# Patient Record
Sex: MALE | Race: WHITE | Employment: UNEMPLOYED | URBAN - NONMETROPOLITAN AREA
[De-identification: names, ages, dates, MRNs, and addresses within clinical notes are randomized per-mention and may not be internally consistent; named-entity substitution may affect disease eponyms.]

---

## 2022-12-22 ENCOUNTER — HOSPITAL ENCOUNTER (OUTPATIENT)
Age: 42
Setting detail: OBSERVATION
Discharge: HOME | End: 2022-12-24
Attending: INTERNAL MEDICINE | Admitting: INTERNAL MEDICINE
Payer: COMMERCIAL

## 2022-12-22 ENCOUNTER — APPOINTMENT (OUTPATIENT)
Dept: CT IMAGING | Age: 42
End: 2022-12-22
Payer: COMMERCIAL

## 2022-12-22 DIAGNOSIS — F11.93 OPIOID WITHDRAWAL (HCC): ICD-10-CM

## 2022-12-22 DIAGNOSIS — R11.2 INTRACTABLE NAUSEA AND VOMITING: Primary | ICD-10-CM

## 2022-12-22 DIAGNOSIS — N28.89 KIDNEY MASS: ICD-10-CM

## 2022-12-22 DIAGNOSIS — F19.10 POLYSUBSTANCE ABUSE (HCC): ICD-10-CM

## 2022-12-22 LAB
ALBUMIN SERPL-MCNC: 4.5 G/DL (ref 3.5–5.1)
ALP BLD-CCNC: 98 U/L (ref 38–126)
ALT SERPL-CCNC: 20 U/L (ref 11–66)
AMPHETAMINE+METHAMPHETAMINE URINE SCREEN: POSITIVE
ANION GAP SERPL CALCULATED.3IONS-SCNC: 16 MEQ/L (ref 8–16)
AST SERPL-CCNC: 17 U/L (ref 5–40)
BACTERIA: ABNORMAL /HPF
BARBITURATE QUANTITATIVE URINE: NEGATIVE
BASOPHILS # BLD: 0.5 %
BASOPHILS ABSOLUTE: 0.1 THOU/MM3 (ref 0–0.1)
BENZODIAZEPINE QUANTITATIVE URINE: NEGATIVE
BILIRUB SERPL-MCNC: 1 MG/DL (ref 0.3–1.2)
BILIRUBIN URINE: NEGATIVE
BLOOD, URINE: NEGATIVE
BUN BLDV-MCNC: 16 MG/DL (ref 7–22)
C-REACTIVE PROTEIN: < 0.3 MG/DL (ref 0–1)
CALCIUM SERPL-MCNC: 9.8 MG/DL (ref 8.5–10.5)
CANNABINOID QUANTITATIVE URINE: POSITIVE
CASTS 2: ABNORMAL /LPF
CASTS UA: ABNORMAL /LPF
CHARACTER, URINE: CLEAR
CHLORIDE BLD-SCNC: 102 MEQ/L (ref 98–111)
CO2: 22 MEQ/L (ref 23–33)
COCAINE METABOLITE QUANTITATIVE URINE: NEGATIVE
COLOR: YELLOW
CREAT SERPL-MCNC: 0.6 MG/DL (ref 0.4–1.2)
CRYSTALS, UA: ABNORMAL
EKG ATRIAL RATE: 59 BPM
EKG P AXIS: -14 DEGREES
EKG P-R INTERVAL: 144 MS
EKG Q-T INTERVAL: 418 MS
EKG QRS DURATION: 100 MS
EKG QTC CALCULATION (BAZETT): 413 MS
EKG R AXIS: 54 DEGREES
EKG T AXIS: 50 DEGREES
EKG VENTRICULAR RATE: 59 BPM
EOSINOPHIL # BLD: 0.2 %
EOSINOPHILS ABSOLUTE: 0 THOU/MM3 (ref 0–0.4)
EPITHELIAL CELLS, UA: ABNORMAL /HPF
ERYTHROCYTE [DISTWIDTH] IN BLOOD BY AUTOMATED COUNT: 12 % (ref 11.5–14.5)
ERYTHROCYTE [DISTWIDTH] IN BLOOD BY AUTOMATED COUNT: 36.3 FL (ref 35–45)
ETHYL ALCOHOL, SERUM: < 0.01 %
FENTANYL: POSITIVE
GFR SERPL CREATININE-BSD FRML MDRD: > 60 ML/MIN/1.73M2
GLUCOSE BLD-MCNC: 153 MG/DL (ref 70–108)
GLUCOSE URINE: NEGATIVE MG/DL
HCT VFR BLD CALC: 45.9 % (ref 42–52)
HEMOGLOBIN: 16.4 GM/DL (ref 14–18)
IMMATURE GRANS (ABS): 0.07 THOU/MM3 (ref 0–0.07)
IMMATURE GRANULOCYTES: 0.5 %
INFLUENZA A: NOT DETECTED
INFLUENZA B: NOT DETECTED
KETONES, URINE: 80
LACTIC ACID: 1.3 MMOL/L (ref 0.5–2)
LEUKOCYTE ESTERASE, URINE: NEGATIVE
LIPASE: 10.5 U/L (ref 5.6–51.3)
LYMPHOCYTES # BLD: 16.3 %
LYMPHOCYTES ABSOLUTE: 2.3 THOU/MM3 (ref 1–4.8)
MAGNESIUM: 2.2 MG/DL (ref 1.6–2.4)
MCH RBC QN AUTO: 30.1 PG (ref 26–33)
MCHC RBC AUTO-ENTMCNC: 35.7 GM/DL (ref 32.2–35.5)
MCV RBC AUTO: 84.2 FL (ref 80–94)
MISCELLANEOUS 2: ABNORMAL
MONOCYTES # BLD: 3.6 %
MONOCYTES ABSOLUTE: 0.5 THOU/MM3 (ref 0.4–1.3)
NITRITE, URINE: NEGATIVE
NUCLEATED RED BLOOD CELLS: 0 /100 WBC
OPIATES, URINE: NEGATIVE
OSMOLALITY CALCULATION: 283.6 MOSMOL/KG (ref 275–300)
OXYCODONE: POSITIVE
PH UA: 8.5 (ref 5–9)
PHENCYCLIDINE QUANTITATIVE URINE: NEGATIVE
PLATELET # BLD: 418 THOU/MM3 (ref 130–400)
PMV BLD AUTO: 9.7 FL (ref 9.4–12.4)
POTASSIUM SERPL-SCNC: 3.7 MEQ/L (ref 3.5–5.2)
PROTEIN UA: 30
RBC # BLD: 5.45 MILL/MM3 (ref 4.7–6.1)
RBC URINE: ABNORMAL /HPF
RENAL EPITHELIAL, UA: ABNORMAL
SARS-COV-2 RNA, RT PCR: NOT DETECTED
SEG NEUTROPHILS: 78.9 %
SEGMENTED NEUTROPHILS ABSOLUTE COUNT: 11 THOU/MM3 (ref 1.8–7.7)
SODIUM BLD-SCNC: 140 MEQ/L (ref 135–145)
SPECIFIC GRAVITY, URINE: 1.03 (ref 1–1.03)
TOTAL PROTEIN: 7.7 G/DL (ref 6.1–8)
TROPONIN T: < 0.01 NG/ML
UROBILINOGEN, URINE: 1 EU/DL (ref 0–1)
WBC # BLD: 14 THOU/MM3 (ref 4.8–10.8)
WBC UA: ABNORMAL /HPF
YEAST: ABNORMAL

## 2022-12-22 PROCEDURE — 96374 THER/PROPH/DIAG INJ IV PUSH: CPT

## 2022-12-22 PROCEDURE — 83605 ASSAY OF LACTIC ACID: CPT

## 2022-12-22 PROCEDURE — 6370000000 HC RX 637 (ALT 250 FOR IP): Performed by: PHYSICIAN ASSISTANT

## 2022-12-22 PROCEDURE — 80307 DRUG TEST PRSMV CHEM ANLYZR: CPT

## 2022-12-22 PROCEDURE — 96372 THER/PROPH/DIAG INJ SC/IM: CPT

## 2022-12-22 PROCEDURE — 2580000003 HC RX 258: Performed by: STUDENT IN AN ORGANIZED HEALTH CARE EDUCATION/TRAINING PROGRAM

## 2022-12-22 PROCEDURE — 84484 ASSAY OF TROPONIN QUANT: CPT

## 2022-12-22 PROCEDURE — 99285 EMERGENCY DEPT VISIT HI MDM: CPT

## 2022-12-22 PROCEDURE — 74177 CT ABD & PELVIS W/CONTRAST: CPT

## 2022-12-22 PROCEDURE — 82077 ASSAY SPEC XCP UR&BREATH IA: CPT

## 2022-12-22 PROCEDURE — 96376 TX/PRO/DX INJ SAME DRUG ADON: CPT

## 2022-12-22 PROCEDURE — G0378 HOSPITAL OBSERVATION PER HR: HCPCS

## 2022-12-22 PROCEDURE — 96375 TX/PRO/DX INJ NEW DRUG ADDON: CPT

## 2022-12-22 PROCEDURE — 83735 ASSAY OF MAGNESIUM: CPT

## 2022-12-22 PROCEDURE — 96361 HYDRATE IV INFUSION ADD-ON: CPT

## 2022-12-22 PROCEDURE — 86140 C-REACTIVE PROTEIN: CPT

## 2022-12-22 PROCEDURE — 85025 COMPLETE CBC W/AUTO DIFF WBC: CPT

## 2022-12-22 PROCEDURE — 6370000000 HC RX 637 (ALT 250 FOR IP): Performed by: STUDENT IN AN ORGANIZED HEALTH CARE EDUCATION/TRAINING PROGRAM

## 2022-12-22 PROCEDURE — 6360000002 HC RX W HCPCS

## 2022-12-22 PROCEDURE — 6360000002 HC RX W HCPCS: Performed by: PHYSICIAN ASSISTANT

## 2022-12-22 PROCEDURE — 6360000004 HC RX CONTRAST MEDICATION: Performed by: PHYSICIAN ASSISTANT

## 2022-12-22 PROCEDURE — 80053 COMPREHEN METABOLIC PANEL: CPT

## 2022-12-22 PROCEDURE — 2580000003 HC RX 258: Performed by: PHYSICIAN ASSISTANT

## 2022-12-22 PROCEDURE — 6360000002 HC RX W HCPCS: Performed by: STUDENT IN AN ORGANIZED HEALTH CARE EDUCATION/TRAINING PROGRAM

## 2022-12-22 PROCEDURE — 83690 ASSAY OF LIPASE: CPT

## 2022-12-22 PROCEDURE — 87636 SARSCOV2 & INF A&B AMP PRB: CPT

## 2022-12-22 PROCEDURE — 81001 URINALYSIS AUTO W/SCOPE: CPT

## 2022-12-22 PROCEDURE — 36415 COLL VENOUS BLD VENIPUNCTURE: CPT

## 2022-12-22 PROCEDURE — 93005 ELECTROCARDIOGRAM TRACING: CPT | Performed by: PHYSICIAN ASSISTANT

## 2022-12-22 RX ORDER — HALOPERIDOL 5 MG/ML
2.5 INJECTION INTRAMUSCULAR
Status: COMPLETED | OUTPATIENT
Start: 2022-12-22 | End: 2022-12-22

## 2022-12-22 RX ORDER — 0.9 % SODIUM CHLORIDE 0.9 %
1000 INTRAVENOUS SOLUTION INTRAVENOUS ONCE
Status: COMPLETED | OUTPATIENT
Start: 2022-12-22 | End: 2022-12-22

## 2022-12-22 RX ORDER — PROMETHAZINE HYDROCHLORIDE 25 MG/ML
6.25 INJECTION, SOLUTION INTRAMUSCULAR; INTRAVENOUS EVERY 6 HOURS PRN
Status: DISCONTINUED | OUTPATIENT
Start: 2022-12-22 | End: 2022-12-24 | Stop reason: HOSPADM

## 2022-12-22 RX ORDER — ACETAMINOPHEN 325 MG/1
650 TABLET ORAL EVERY 6 HOURS PRN
Status: DISCONTINUED | OUTPATIENT
Start: 2022-12-22 | End: 2022-12-24 | Stop reason: HOSPADM

## 2022-12-22 RX ORDER — LORAZEPAM 2 MG/ML
1 INJECTION INTRAMUSCULAR EVERY 6 HOURS PRN
Status: DISCONTINUED | OUTPATIENT
Start: 2022-12-22 | End: 2022-12-22

## 2022-12-22 RX ORDER — ONDANSETRON 2 MG/ML
4 INJECTION INTRAMUSCULAR; INTRAVENOUS EVERY 6 HOURS PRN
Status: DISCONTINUED | OUTPATIENT
Start: 2022-12-22 | End: 2022-12-24 | Stop reason: HOSPADM

## 2022-12-22 RX ORDER — ONDANSETRON 2 MG/ML
4 INJECTION INTRAMUSCULAR; INTRAVENOUS ONCE
Status: COMPLETED | OUTPATIENT
Start: 2022-12-22 | End: 2022-12-22

## 2022-12-22 RX ORDER — SODIUM CHLORIDE 9 MG/ML
INJECTION, SOLUTION INTRAVENOUS PRN
Status: DISCONTINUED | OUTPATIENT
Start: 2022-12-22 | End: 2022-12-24 | Stop reason: HOSPADM

## 2022-12-22 RX ORDER — ONDANSETRON 4 MG/1
4 TABLET, ORALLY DISINTEGRATING ORAL EVERY 8 HOURS PRN
Status: DISCONTINUED | OUTPATIENT
Start: 2022-12-22 | End: 2022-12-24 | Stop reason: HOSPADM

## 2022-12-22 RX ORDER — DICYCLOMINE HYDROCHLORIDE 10 MG/ML
20 INJECTION INTRAMUSCULAR ONCE
Status: COMPLETED | OUTPATIENT
Start: 2022-12-22 | End: 2022-12-22

## 2022-12-22 RX ORDER — LORAZEPAM 2 MG/ML
1 INJECTION INTRAMUSCULAR ONCE
Status: COMPLETED | OUTPATIENT
Start: 2022-12-22 | End: 2022-12-22

## 2022-12-22 RX ORDER — LANOLIN ALCOHOL/MO/W.PET/CERES
1.5 CREAM (GRAM) TOPICAL NIGHTLY PRN
Status: DISCONTINUED | OUTPATIENT
Start: 2022-12-22 | End: 2022-12-24 | Stop reason: HOSPADM

## 2022-12-22 RX ORDER — HYDROXYZINE PAMOATE 25 MG/1
50 CAPSULE ORAL ONCE
Status: COMPLETED | OUTPATIENT
Start: 2022-12-22 | End: 2022-12-22

## 2022-12-22 RX ORDER — ACETAMINOPHEN 650 MG/1
650 SUPPOSITORY RECTAL EVERY 6 HOURS PRN
Status: DISCONTINUED | OUTPATIENT
Start: 2022-12-22 | End: 2022-12-24 | Stop reason: HOSPADM

## 2022-12-22 RX ORDER — SODIUM CHLORIDE 0.9 % (FLUSH) 0.9 %
5-40 SYRINGE (ML) INJECTION EVERY 12 HOURS SCHEDULED
Status: DISCONTINUED | OUTPATIENT
Start: 2022-12-22 | End: 2022-12-24 | Stop reason: HOSPADM

## 2022-12-22 RX ORDER — LORAZEPAM 2 MG/ML
1 INJECTION INTRAMUSCULAR EVERY 4 HOURS PRN
Status: DISCONTINUED | OUTPATIENT
Start: 2022-12-22 | End: 2022-12-24 | Stop reason: HOSPADM

## 2022-12-22 RX ORDER — POLYETHYLENE GLYCOL 3350 17 G/17G
17 POWDER, FOR SOLUTION ORAL DAILY PRN
Status: DISCONTINUED | OUTPATIENT
Start: 2022-12-22 | End: 2022-12-24 | Stop reason: HOSPADM

## 2022-12-22 RX ORDER — ENOXAPARIN SODIUM 100 MG/ML
40 INJECTION SUBCUTANEOUS EVERY 24 HOURS
Status: DISCONTINUED | OUTPATIENT
Start: 2022-12-22 | End: 2022-12-24 | Stop reason: HOSPADM

## 2022-12-22 RX ORDER — KETOROLAC TROMETHAMINE 30 MG/ML
30 INJECTION, SOLUTION INTRAMUSCULAR; INTRAVENOUS ONCE
Status: COMPLETED | OUTPATIENT
Start: 2022-12-22 | End: 2022-12-22

## 2022-12-22 RX ORDER — SODIUM CHLORIDE 0.9 % (FLUSH) 0.9 %
5-40 SYRINGE (ML) INJECTION PRN
Status: DISCONTINUED | OUTPATIENT
Start: 2022-12-22 | End: 2022-12-24 | Stop reason: HOSPADM

## 2022-12-22 RX ORDER — SODIUM CHLORIDE 9 MG/ML
INJECTION, SOLUTION INTRAVENOUS CONTINUOUS
Status: DISCONTINUED | OUTPATIENT
Start: 2022-12-22 | End: 2022-12-23

## 2022-12-22 RX ORDER — LORAZEPAM 2 MG/ML
0.5 INJECTION INTRAMUSCULAR ONCE
Status: DISCONTINUED | OUTPATIENT
Start: 2022-12-22 | End: 2022-12-22

## 2022-12-22 RX ORDER — LORAZEPAM 2 MG/ML
4 INJECTION INTRAMUSCULAR EVERY 6 HOURS PRN
Status: DISCONTINUED | OUTPATIENT
Start: 2022-12-22 | End: 2022-12-22

## 2022-12-22 RX ADMIN — HYDROXYZINE PAMOATE 50 MG: 25 CAPSULE ORAL at 10:54

## 2022-12-22 RX ADMIN — ACETAMINOPHEN 650 MG: 325 TABLET ORAL at 22:45

## 2022-12-22 RX ADMIN — SODIUM CHLORIDE: 9 INJECTION, SOLUTION INTRAVENOUS at 18:17

## 2022-12-22 RX ADMIN — LORAZEPAM 1 MG: 2 INJECTION INTRAMUSCULAR; INTRAVENOUS at 22:45

## 2022-12-22 RX ADMIN — KETOROLAC TROMETHAMINE 30 MG: 30 INJECTION, SOLUTION INTRAMUSCULAR; INTRAVENOUS at 12:37

## 2022-12-22 RX ADMIN — ONDANSETRON 4 MG: 2 INJECTION INTRAMUSCULAR; INTRAVENOUS at 10:52

## 2022-12-22 RX ADMIN — ALUMINUM HYDROXIDE, MAGNESIUM HYDROXIDE, AND SIMETHICONE: 200; 200; 20 SUSPENSION ORAL at 12:35

## 2022-12-22 RX ADMIN — LORAZEPAM 1 MG: 2 INJECTION INTRAMUSCULAR; INTRAVENOUS at 18:26

## 2022-12-22 RX ADMIN — IOPAMIDOL 80 ML: 755 INJECTION, SOLUTION INTRAVENOUS at 14:02

## 2022-12-22 RX ADMIN — HALOPERIDOL LACTATE 2.5 MG: 5 INJECTION, SOLUTION INTRAMUSCULAR at 15:20

## 2022-12-22 RX ADMIN — DICYCLOMINE HYDROCHLORIDE 20 MG: 10 INJECTION, SOLUTION INTRAMUSCULAR at 10:54

## 2022-12-22 RX ADMIN — Medication 1.5 MG: at 22:50

## 2022-12-22 RX ADMIN — SODIUM CHLORIDE 1000 ML: 9 INJECTION, SOLUTION INTRAVENOUS at 10:52

## 2022-12-22 RX ADMIN — ONDANSETRON 4 MG: 2 INJECTION INTRAMUSCULAR; INTRAVENOUS at 12:37

## 2022-12-22 RX ADMIN — LORAZEPAM 1 MG: 2 INJECTION INTRAMUSCULAR; INTRAVENOUS at 13:15

## 2022-12-22 ASSESSMENT — PAIN DESCRIPTION - LOCATION
LOCATION: ABDOMEN
LOCATION: BACK

## 2022-12-22 ASSESSMENT — PAIN SCALES - GENERAL
PAINLEVEL_OUTOF10: 7
PAINLEVEL_OUTOF10: 6

## 2022-12-22 ASSESSMENT — ENCOUNTER SYMPTOMS
SHORTNESS OF BREATH: 0
DIARRHEA: 1
NAUSEA: 1
ABDOMINAL PAIN: 1
VOMITING: 1
PHOTOPHOBIA: 0

## 2022-12-22 ASSESSMENT — PAIN DESCRIPTION - DESCRIPTORS: DESCRIPTORS: ACHING

## 2022-12-22 ASSESSMENT — PAIN - FUNCTIONAL ASSESSMENT: PAIN_FUNCTIONAL_ASSESSMENT: 0-10

## 2022-12-22 NOTE — ED NOTES
Pt is currently stable will be transported by wheelchair. Spoke with charge nurse Wing Cisneros.        Huang Hoang  12/22/22 9877

## 2022-12-22 NOTE — CARE COORDINATION
Per request of Anaya Sanchez, LALIT spoke with spouse and provided listing of addiction services. Answered all questions, denied additional needs or resources at this time.

## 2022-12-22 NOTE — ED NOTES
ED to inpatient nurses report    Chief Complaint   Patient presents with    Addiction Problem      Present to ED from home  LOC: alert and orientated to name, place, date  Vital signs   Vitals:    12/22/22 1035 12/22/22 1234 12/22/22 1458 12/22/22 1605   BP: (!) 190/106 (!) 184/96 (!) 167/115 (!) 122/98   Pulse: 59 84 66    Resp: 20 20 16    Temp: 97.6 °F (36.4 °C)      TempSrc: Oral      SpO2: 100% 99% 98%    Weight:       Height:          Oxygen Baseline RA    Current needs required RA Bipap/Cpap No  LDAs:   Peripheral IV 12/22/22 Right Forearm (Active)   Site Assessment Clean, dry & intact 12/22/22 1046     Mobility: Requires assistance * 1  Pending ED orders: NA  Present condition: STABLE  C-SSRS    Swallow Screening    Preferred Language: [place]     Electronically signed by Kathya Dominguez, RN on 12/22/2022 at 5:19 PM       Jesus Siddiqui RN  12/22/22 8277

## 2022-12-22 NOTE — ED NOTES
Patient in bed still stating his abdomen hurts, patient medicated per bebo Mitchell RN  12/22/22 3821

## 2022-12-22 NOTE — PROGRESS NOTES
Attempted virtual admission process once pt arrived to floor. Pt with c/o being tired, and wants to take a nap. Explained the importance of completing his admission for continuity of care, agreed to take a nap, and work with night shift to complete admission this evening.  Plan to notify night shift relief virtual nurse if time permits

## 2022-12-22 NOTE — ED NOTES
Patient to ED for heroin withdrawal. Patient report last used yesterday or day before. Patient woke up with abdominal pain vomiting chills. Patient reports taking a friends suboxone that did not help.       Casa Elizabeth RN  12/22/22 1675

## 2022-12-22 NOTE — ED PROVIDER NOTES
[facility]  eMERGENCY dEPARTMENT eNCOUnter          CHIEF COMPLAINT       Chief Complaint   Patient presents with    Addiction Problem       Nurses Notes reviewed and I agree except as noted inthe HPI. HISTORY OF PRESENT ILLNESS    Marco Antonio Ryan is a 38 y.o. male who presents to the Emergency Department for the evaluation of heroin withdrawal.  Patient poorly able/willing to cooperate with history and history is obtained primarily from family at bedside. They report the patient has history of substance addiction 5 or 6 years ago, has been sober for a long time and relapsed but the are unsure of the timeline of the relapse. Patient had admitted to them a few days ago that he wanted help and had acknowledged that he is snorting heroin. Last use estimated to be 1 to 2 days ago. Around 1 or 2 AM, patient woke family asking for help with active vomiting. Family is aware of marijuana use but states the patient does not drink alcohol. Patient is able to clarify that he snorts 1 to 2 g of heroin per day and acknowledges chills, generalized body aches, sweats, nausea, vomiting, diarrhea and abdominal cramping. Symptoms are similar to prior withdrawal but he feels the abdominal pain is worse. All emesis and stool has been nonbloody. The HPI was provided by the patient. REVIEW OF SYSTEMS     Review of Systems   Constitutional:  Positive for appetite change, chills and diaphoresis. Eyes:  Negative for photophobia. Respiratory:  Negative for shortness of breath. Cardiovascular:  Negative for chest pain. Gastrointestinal:  Positive for abdominal pain, diarrhea, nausea and vomiting. Musculoskeletal:  Positive for myalgias. Neurological:  Positive for headaches. Negative for light-headedness. All other systems reviewed and are negative. PAST MEDICAL HISTORY    has no past medical history on file. SURGICAL HISTORY      has no past surgical history on file.     CURRENT MEDICATIONS       Previous Medications    No medications on file       ALLERGIES     has No Known Allergies. FAMILY HISTORY     has no family status information on file. family history is not on file. SOCIAL HISTORY      reports current drug use. Drug: Other-see comments. PHYSICAL EXAM     INITIAL VITALS:  height is 5' 6\" (1.676 m) and weight is 190 lb (86.2 kg). His oral temperature is 97.6 °F (36.4 °C). His blood pressure is 122/98 (abnormal) and his pulse is 66. His respiration is 16 and oxygen saturation is 98%. Physical Exam  Vitals and nursing note reviewed. Constitutional:       Appearance: He is ill-appearing. HENT:      Head: Normocephalic and atraumatic. Nose: Nose normal.      Mouth/Throat:      Mouth: Mucous membranes are dry. Eyes:      Conjunctiva/sclera: Conjunctivae normal.   Cardiovascular:      Rate and Rhythm: Normal rate and regular rhythm. Heart sounds: Normal heart sounds. No murmur heard. Pulmonary:      Effort: Pulmonary effort is normal. No respiratory distress. Breath sounds: Normal breath sounds. No wheezing or rhonchi. Abdominal:      Palpations: Abdomen is soft. Tenderness: There is abdominal tenderness (Patient crying out in pain on light palpation diffusely). There is no guarding or rebound. Musculoskeletal:      Cervical back: Normal range of motion. Skin:     General: Skin is warm and dry. Neurological:      General: No focal deficit present. Mental Status: He is alert and oriented to person, place, and time. GCS: GCS eye subscore is 4. GCS verbal subscore is 5. GCS motor subscore is 6.       Comments: Patient lying with eyes closed, moaning throughout much of examination but responds appropriately to verbal stimuli   Psychiatric:         Mood and Affect: Mood normal.       DIFFERENTIAL DIAGNOSIS:   Differential diagnoses are discussed    DIAGNOSTIC RESULTS     EKG: All EKG's are interpreted by the Emergency Department Physician who either signs or Co-signsthis chart in the absence of a cardiologist.    Aurelio Alli. Rate: 59 bpm  PRinterval: 144 ms  QRS duration: 100 ms  QTc: 413 ms  P-R-T axes: -14, 54, 50  Sinus bradycardia with sinus arrhythmia. No STEMI. RADIOLOGY: non-plain film images(s) such as CT, Ultrasound and MRI are read by the radiologist.    CT ABDOMEN PELVIS W IV CONTRAST Additional Contrast? None   Final Result    3 cm heterogeneously enhancing mass at the inferior pole of the left kidney is concerning for malignancy. No definite lymphadenopathy identified. **This report has been created using voice recognition software. It may contain minor errors which are inherent in voice recognition technology. **      Final report electronically signed by Dr. Emily Grullon MD on 12/22/2022 2:17 PM          LABS:      Labs Reviewed   CBC WITH AUTO DIFFERENTIAL - Abnormal; Notable for the following components:       Result Value    WBC 14.0 (*)     MCHC 35.7 (*)     Platelets 736 (*)     Segs Absolute 11.0 (*)     All other components within normal limits   COMPREHENSIVE METABOLIC PANEL - Abnormal; Notable for the following components:    Glucose 153 (*)     CO2 22 (*)     All other components within normal limits   URINE WITH REFLEXED MICRO - Abnormal; Notable for the following components:    Ketones, Urine 80 (*)     Protein, UA 30 (*)     All other components within normal limits   COVID-19 & INFLUENZA COMBO   MAGNESIUM   LIPASE   LACTIC ACID   C-REACTIVE PROTEIN   URINE DRUG SCREEN   ETHANOL   TROPONIN   ANION GAP   OSMOLALITY   GLOMERULAR FILTRATION RATE, ESTIMATED       EMERGENCY DEPARTMENT COURSE:   Vitals:    Vitals:    12/22/22 1035 12/22/22 1234 12/22/22 1458 12/22/22 1605   BP: (!) 190/106 (!) 184/96 (!) 167/115 (!) 122/98   Pulse: 59 84 66    Resp: 20 20 16    Temp: 97.6 °F (36.4 °C)      TempSrc: Oral      SpO2: 100% 99% 98%    Weight:       Height:          10:49 AM EST: The patient was seen and evaluated. Patient presents with hypertension and otherwise reassuring vital signs, complaining of heroin withdrawal.  History obtained primarily from wife at bedside as patient is moaning, rolling in the exam bed and calling out for help. Abdomen is without guarding but with reproducible tenderness diffusely, worse in the upper abdomen. Initial treatment with IV fluids, Bentyl, Vistaril, Zofran was unsuccessful. Following treatment with Toradol, GI cocktail, Zofran also unsuccessful in controlling the vomiting. He had laboratory studies consistent with mild dehydration but urine was positive for methamphetamines, marijuana, oxycodone and fentanyl. Given his restlessness, there was question as to whether any potential methamphetamine intoxication was contributing and Ativan was trialed without change in his condition. We did also discuss potential for cyclical vomiting syndrome and he was treated with Haldol. He was much calmer, resting peacefully on follow-up evaluation but continued to have emesis with attempts at oral intake. Blood pressure is notably improved. Given the intractable nature of his vomiting, we did discuss potential admission and patient/family do prefer this. Discussed with the hospitalist service who agreed to admit the patient for further management. CRITICAL CARE:   None    CONSULTS:  Hospitalist    PROCEDURES:  None    FINAL IMPRESSION      1. Intractable nausea and vomiting    2. Polysubstance abuse ([facility] [address]          DISPOSITION/PLAN   Admit    PATIENT REFERRED TO:  No follow-up provider specified.     DISCHARGEMEDICATIONS:  New Prescriptions    No medications on file       (Please note that portions of this note were completedwith a voice recognition program.  Efforts were made to edit the dictations but occasionally words are mis-transcribed.)        Claudean Minerva, PA-C  12/22/22 8795

## 2022-12-22 NOTE — Clinical Note
Gaudencio Rosenthalmarilynnlizett was seen and treated in our emergency department on 12/22/2022.     Gaudencio Grmarilynnlizett was seen in [facility] on 12/22/22    Alejandra Villanueva PA-C

## 2022-12-23 PROBLEM — R11.2 INTRACTABLE NAUSEA AND VOMITING: Status: ACTIVE | Noted: 2022-12-23

## 2022-12-23 LAB
ANION GAP SERPL CALCULATED.3IONS-SCNC: 13 MEQ/L (ref 8–16)
BUN BLDV-MCNC: 14 MG/DL (ref 7–22)
CALCIUM SERPL-MCNC: 9.2 MG/DL (ref 8.5–10.5)
CHLORIDE BLD-SCNC: 101 MEQ/L (ref 98–111)
CO2: 22 MEQ/L (ref 23–33)
CREAT SERPL-MCNC: 0.6 MG/DL (ref 0.4–1.2)
ERYTHROCYTE [DISTWIDTH] IN BLOOD BY AUTOMATED COUNT: 12 % (ref 11.5–14.5)
ERYTHROCYTE [DISTWIDTH] IN BLOOD BY AUTOMATED COUNT: 36.3 FL (ref 35–45)
GFR SERPL CREATININE-BSD FRML MDRD: > 60 ML/MIN/1.73M2
GLUCOSE BLD-MCNC: 120 MG/DL (ref 70–108)
HCT VFR BLD CALC: 44.1 % (ref 42–52)
HEMOGLOBIN: 15.5 GM/DL (ref 14–18)
MCH RBC QN AUTO: 29.6 PG (ref 26–33)
MCHC RBC AUTO-ENTMCNC: 35.1 GM/DL (ref 32.2–35.5)
MCV RBC AUTO: 84.3 FL (ref 80–94)
PLATELET # BLD: 418 THOU/MM3 (ref 130–400)
PMV BLD AUTO: 9.7 FL (ref 9.4–12.4)
POTASSIUM SERPL-SCNC: 3.5 MEQ/L (ref 3.5–5.2)
RBC # BLD: 5.23 MILL/MM3 (ref 4.7–6.1)
SODIUM BLD-SCNC: 136 MEQ/L (ref 135–145)
WBC # BLD: 17.3 THOU/MM3 (ref 4.8–10.8)

## 2022-12-23 PROCEDURE — G0378 HOSPITAL OBSERVATION PER HR: HCPCS

## 2022-12-23 PROCEDURE — 6360000002 HC RX W HCPCS: Performed by: STUDENT IN AN ORGANIZED HEALTH CARE EDUCATION/TRAINING PROGRAM

## 2022-12-23 PROCEDURE — 96361 HYDRATE IV INFUSION ADD-ON: CPT

## 2022-12-23 PROCEDURE — 6370000000 HC RX 637 (ALT 250 FOR IP): Performed by: INTERNAL MEDICINE

## 2022-12-23 PROCEDURE — 6360000002 HC RX W HCPCS

## 2022-12-23 PROCEDURE — 6370000000 HC RX 637 (ALT 250 FOR IP): Performed by: STUDENT IN AN ORGANIZED HEALTH CARE EDUCATION/TRAINING PROGRAM

## 2022-12-23 PROCEDURE — 80048 BASIC METABOLIC PNL TOTAL CA: CPT

## 2022-12-23 PROCEDURE — 96372 THER/PROPH/DIAG INJ SC/IM: CPT

## 2022-12-23 PROCEDURE — 2580000003 HC RX 258: Performed by: STUDENT IN AN ORGANIZED HEALTH CARE EDUCATION/TRAINING PROGRAM

## 2022-12-23 PROCEDURE — 96376 TX/PRO/DX INJ SAME DRUG ADON: CPT

## 2022-12-23 PROCEDURE — 85027 COMPLETE CBC AUTOMATED: CPT

## 2022-12-23 PROCEDURE — 36415 COLL VENOUS BLD VENIPUNCTURE: CPT

## 2022-12-23 RX ORDER — LABETALOL 100 MG/1
50 TABLET, FILM COATED ORAL EVERY 12 HOURS SCHEDULED
Status: DISCONTINUED | OUTPATIENT
Start: 2022-12-23 | End: 2022-12-24 | Stop reason: HOSPADM

## 2022-12-23 RX ORDER — HYDROXYZINE HYDROCHLORIDE 25 MG/1
50 TABLET, FILM COATED ORAL ONCE
Status: COMPLETED | OUTPATIENT
Start: 2022-12-23 | End: 2022-12-23

## 2022-12-23 RX ORDER — KETOROLAC TROMETHAMINE 30 MG/ML
15 INJECTION, SOLUTION INTRAMUSCULAR; INTRAVENOUS ONCE
Status: COMPLETED | OUTPATIENT
Start: 2022-12-23 | End: 2022-12-23

## 2022-12-23 RX ORDER — BUPRENORPHINE 2 MG/1
8 TABLET SUBLINGUAL DAILY
Status: DISCONTINUED | OUTPATIENT
Start: 2022-12-23 | End: 2022-12-23

## 2022-12-23 RX ORDER — LISINOPRIL 20 MG/1
20 TABLET ORAL DAILY
Status: DISCONTINUED | OUTPATIENT
Start: 2022-12-23 | End: 2022-12-24 | Stop reason: HOSPADM

## 2022-12-23 RX ORDER — BUPRENORPHINE AND NALOXONE 8; 2 MG/1; MG/1
1 FILM, SOLUBLE BUCCAL; SUBLINGUAL 2 TIMES DAILY
Status: DISCONTINUED | OUTPATIENT
Start: 2022-12-23 | End: 2022-12-24 | Stop reason: HOSPADM

## 2022-12-23 RX ORDER — BACLOFEN 10 MG/1
10 TABLET ORAL EVERY 8 HOURS PRN
Status: DISCONTINUED | OUTPATIENT
Start: 2022-12-23 | End: 2022-12-24 | Stop reason: HOSPADM

## 2022-12-23 RX ADMIN — ENOXAPARIN SODIUM 40 MG: 100 INJECTION SUBCUTANEOUS at 19:45

## 2022-12-23 RX ADMIN — LABETALOL HYDROCHLORIDE 50 MG: 100 TABLET, FILM COATED ORAL at 19:44

## 2022-12-23 RX ADMIN — LORAZEPAM 1 MG: 2 INJECTION INTRAMUSCULAR; INTRAVENOUS at 09:34

## 2022-12-23 RX ADMIN — ONDANSETRON 4 MG: 2 INJECTION INTRAMUSCULAR; INTRAVENOUS at 09:34

## 2022-12-23 RX ADMIN — SODIUM CHLORIDE, PRESERVATIVE FREE 10 ML: 5 INJECTION INTRAVENOUS at 19:45

## 2022-12-23 RX ADMIN — KETOROLAC TROMETHAMINE 15 MG: 30 INJECTION, SOLUTION INTRAMUSCULAR; INTRAVENOUS at 01:26

## 2022-12-23 RX ADMIN — LORAZEPAM 1 MG: 2 INJECTION INTRAMUSCULAR; INTRAVENOUS at 23:21

## 2022-12-23 RX ADMIN — LISINOPRIL 20 MG: 20 TABLET ORAL at 15:39

## 2022-12-23 RX ADMIN — BACLOFEN 10 MG: 10 TABLET ORAL at 09:35

## 2022-12-23 RX ADMIN — BUPRENORPHINE AND NALOXONE 1 FILM: 8; 2 FILM BUCCAL; SUBLINGUAL at 19:44

## 2022-12-23 RX ADMIN — BUPRENORPHINE AND NALOXONE 1 FILM: 8; 2 FILM BUCCAL; SUBLINGUAL at 15:44

## 2022-12-23 RX ADMIN — SODIUM CHLORIDE: 9 INJECTION, SOLUTION INTRAVENOUS at 06:26

## 2022-12-23 RX ADMIN — HYDROXYZINE HYDROCHLORIDE 50 MG: 25 TABLET, FILM COATED ORAL at 01:26

## 2022-12-23 RX ADMIN — ONDANSETRON 4 MG: 2 INJECTION INTRAMUSCULAR; INTRAVENOUS at 23:21

## 2022-12-23 ASSESSMENT — PAIN DESCRIPTION - DESCRIPTORS
DESCRIPTORS: ACHING

## 2022-12-23 ASSESSMENT — PAIN DESCRIPTION - LOCATION
LOCATION: BACK

## 2022-12-23 ASSESSMENT — PAIN SCALES - GENERAL
PAINLEVEL_OUTOF10: 6

## 2022-12-23 ASSESSMENT — PATIENT HEALTH QUESTIONNAIRE - PHQ9: SUM OF ALL RESPONSES TO PHQ QUESTIONS 1-9: 8

## 2022-12-23 ASSESSMENT — LIFESTYLE VARIABLES
HOW OFTEN DO YOU HAVE A DRINK CONTAINING ALCOHOL: NEVER
HOW MANY STANDARD DRINKS CONTAINING ALCOHOL DO YOU HAVE ON A TYPICAL DAY: PATIENT DOES NOT DRINK

## 2022-12-23 NOTE — ED PROVIDER NOTES
Wife called and requested to see if patient was still in ER. She is verified as emergency contact. Transferred her call to [facility] station.        Lashell Murray, APRN - CNP  12/22/22 2035

## 2022-12-23 NOTE — PROGRESS NOTES
Pt continues to toss around in bed. Pt does not follow directions from this nurse. Resource nurse is on the floor. Resource nurse updated. Resource nurse in the room with patient at this time. This nurse will message the doctor about patient's condition. 12/22/22 11:49 PM  [phone] From: [address] [facility] RE: Ama Duran The ativan is not working for this patient. Pt keeps climbing out of bed. I have not been able to leave the nursing station. Is there anyway patient can get a sitter? Would you please come up and assess the patient. Resource Nurse is in the room right now with patient, so I can care for my other pts. Read 11:49 PM       Doctor in the room at this time assessing patient. New orders received.

## 2022-12-23 NOTE — CONSULTS
Department of Psychiatry   Psychiatric Assessment      Thank you very much for allowing us to participate in the care of this patient. Reason for Consult: Opioid withdrawal    HISTORY OF PRESENT ILLNESS:      Patient is a 62-year-old male with extensive history of polysubstance abuse-cannabis oxycodone fentanyl and amphetamines admitted for intractable emesis. Patient discusses at length about his drug abuse. Reports that he initially started out with extensive history of alcohol abuse. Reports he has been sober from alcohol for last 10 years. Mentions that he started using heavy drugs as heroin and fentanyl around 10 years ago. Reports he was able to stay sober for 6 years and he relapsed in 2017. Reports he has been using around a gram of heroin every day by intranasal route. Reports he was just methamphetamines on and off. Currently reports withdrawal symptoms as has tremors and diaphoresis muscle cramps and severe nausea. Reports some feelings of sad down and depression however denies any suicidal or homicidal ideation plan or intent. Reports good sleep and appetite. Could not elicit any manic or hypomanic symptoms. Could not elicit any psychotic symptoms today.   PSYCHIATRIC HISTORY:      Patient reports that he has never seen a psychiatrist or tried psychotropic medications in the past.  Denies any previous suicide attempts or inpatient psychiatric hospitalizations    Lifetime Psychiatric Review of Systems         Obsessions and Compulsions: Denies       Brooke or Hypomania: Denies     Hallucinations: Denies     Panic Attacks:  Denies     Delusions:  Denies     Phobias:  Denies     Trauma: Denies    Prior to Admission medications    Not on File        Medications:    Current Facility-Administered Medications: baclofen (LIORESAL) tablet 10 mg, 10 mg, Oral, Q8H PRN  sodium chloride flush 0.9 % injection 5-40 mL, 5-40 mL, IntraVENous, 2 times per day  sodium chloride flush 0.9 % injection 5-40 mL, 5-40 mL, IntraVENous, PRN  0.9 % sodium chloride infusion, , IntraVENous, PRN  enoxaparin (LOVENOX) injection 40 mg, 40 mg, SubCUTAneous, Q24H  ondansetron (ZOFRAN-ODT) disintegrating tablet 4 mg, 4 mg, Oral, Q8H PRN **OR** ondansetron (ZOFRAN) injection 4 mg, 4 mg, IntraVENous, Q6H PRN  polyethylene glycol (GLYCOLAX) packet 17 g, 17 g, Oral, Daily PRN  acetaminophen (TYLENOL) tablet 650 mg, 650 mg, Oral, Q6H PRN **OR** acetaminophen (TYLENOL) suppository 650 mg, 650 mg, Rectal, Q6H PRN  0.9 % sodium chloride infusion, , IntraVENous, Continuous  promethazine (PHENERGAN) injection 6.25 mg, 6.25 mg, IntraMUSCular, Q6H PRN  melatonin tablet 1.5 mg, 1.5 mg, Oral, Nightly PRN  LORazepam (ATIVAN) injection 1 mg, 1 mg, IntraVENous, Q4H PRN     Past Medical History:    No past medical history on file. Past Surgical History:    No past surgical history on file. Allergies: Patient has no known allergies. Social History:    Patient report that he was born and raised around [address] [place] [address]. 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 Reports that he also has a grandchild  SUBSTANCE USE HISTORY: Extensive history of drug abuse as discussed in HPI.   Reports he did go on Suboxone in the past which was significantly helpful for him for a period of 6 months    Family Medical and Psychiatric History:     Reports extensive history of substance use and father    Physical  BP (!) 159/85   Pulse 73   Temp 98 °F (36.7 °C) (Oral)   Resp 18   Ht 5' 6\" (1.676 m)   Wt 190 lb (86.2 kg)   SpO2 100%   BMI 30.67 kg/m²     Mental Status Examination:  Level of consciousness:  Within normal limits  Appearance: hospital attire, lying in bed, fair grooming  Behavior/Motor:  no abnormalities noted  Attitude toward examiner:  cooperative, attentive and good eye contact  Speech:  Spontaneous, normal rate and volume  Mood: Anxious  Affect: mood congruent  Thought processes:  Linear, goal directed, coherent  Thought content: Denies suicidal ideations   Denies homicidal ideations    Denies hallucinations   Denies delusions  Cognition:  Oriented to self, situation, location, date  Concentration clinically adequate  Memory age appropriate  Insight & Judgment:  fair    DSM-5 DIAGNOSIS:  Polysubstance abuse-methamphetamine and cannabis opioids  Acute opioid withdrawal    Stressors     Severity of stressors is moderate  Source of stressors include: Other psychosocial and environmental stressors    PLAN:    Patient reports that he did somewhat stable when he was on Suboxone in the past.  Consider starting Suboxone 8 mg twice daily starting today evening. Recommend social work to connect him with outpatient psychiatric services at either [facility] or with [facility] MAT program  Can give him 4 days' worth of Suboxone on discharge  Does not require admission to inpatient psychiatric unit. Can consider other supportive medications as Bentyl 20 mg 4 times daily as needed, Flexeril, trazodone to help with withdrawal symptoms. Will sign off. Please call us back with any questions. Thank you very much for allowing us to participate in the care of this patient. Cristhian Alcaraz is a 38 y.o. male being evaluated by a Virtual Visit (video visit) encounter to address concerns as mentioned above. A caregiver was present in the room along with the patient. Pursuant to the emergency declaration under the [facility], [facility] authority and the [employer] General Act, this Virtual Visit was conducted with patient's (and/or legal guardian's) consent, to reduce the patient's risk of exposure to COVID-19 and provide necessary medical care. Services were provided through a video synchronous discussion virtually to substitute for in-person visit by provider.    Patient is present at [address] [employer], [address] [facility] and I am physically present at [place], [place]  This Virtual Visit was conducted with patient's consent. The patient is located in a state where I am licensed to provide care. --Magdalene Bueno MD on 12/23/2022 at 12:49 PM    An electronic signature was used to authenticate this note. **This report has been created using voice recognition software. It may contain minor errors which are inherent in voice recognition technology. **

## 2022-12-23 NOTE — CONSULTS
Brief Intervention and Referral to Treatment Summary    Patient was provided PHQ-9, AUDIT-C and DAST Screening:      PHQ-9 Score: 8  AUDIT-C Score:  0  DAST Score:  8    Patients substance use is considered     Dependent    Patients depression is considered:     Low risk    Brief Education Was Provided    Patient was receptive    Brief Intervention Is Provided (Only for AUDIT-C or DAST)     Patient reports readiness to decrease and/or stop use and a plan was discussed     Recommendations/Referrals for Brief and/or Specialized Treatment Provided to Patient      Psych completed consult with pt as well. Dr. Isabel Ferguson is going to start Suboxone this evening with the patient and then connect him with outpatient psychiatric services at either [facility] or with [facility] MAT program. Pt receptive. Resources also given.

## 2022-12-23 NOTE — PROGRESS NOTES
Internal Medicine Resident Progress Note    Patient:  Gaudencio Woodruff    YOB: 1980  Unit/Bed:6K-18/018-A  MRN: [id number]    Acct: [de-identified]   PCP: No primary care provider on file. Date of Admission: 12/22/2022      Assessment/Plan:  Intractable emesis -secondary to opioid withdrawal.  Multiple medications tried without much success in the ED. Urine noted to have ketones. Patient received 1 L of IV fluid. Plan -initiate Phenergan every 4 hours for nausea. Continue maintenance IV fluids at 100 cc/h     Opioid withdrawal -history of opioid use, recent relapse 10/2022 now with symptoms of opioid withdrawal including abdominal cramping, diarrhea, nausea, emesis. -Urine positive for amphetamines, cannabinoids, oxycodone, fentanyl. Patient interested to get help for his underlying opioid addiction. He will likely need to be seen by Suboxone clinic as an outpatient. Plan -Dr. Marlen Grimes consulted, appreciate recommendations.  -Ativan as needed for anxiety. Addiction services notified.  -Recommends Suboxone 8 mg BID started today  -Follow with outpatient MAT  -Discharged with 4 days worth of Suboxone  -Consider Bentyl 20 qid, Flexeril, trazodone to help with withdrawal symptoms. 3 cm heterogeneously enhancing mass at the inferior pole of the left kidney -as seen on CT abdomen / pelvis. Concern for malignancy. Patient will need to follow-up as an outpatient. Essential HTN:   SBP >160  Pt was taking Enalapril.  Was discontinued   Plan:  -Will start on Labetalol 50mg bid.  -continue to monitor      Chronic Conditions (reviewed and stable unless otherwise stated)      Expected discharge date:  TBD    Disposition:   [x] Home  [] TCU  [x] Rehab - ?  [] Psych  [] SNF  [] [facility]  [] Other-    ===================================================================      Chief Complaint: Intractable Nausea / Opioid withdrawal     Hospital Course: From Hx: Patient is a 38 y.o. male w/ PMHx Opiod Use disorder, Bipolar Disorder, Major Depressive Disorder, Essential HTN, and a period of incarceration who presented to [address] [place] [address] for evaluation of nausea/vomiting. Patient was unwilling or poorly able to provide history. It was provided per patient's wife, he has been clean for the last 5 years. Recently, following the death of his stepfather, he began to use again. His drug screen was positive for oxycodone, fentanyl, amphetamines, cannabis. Patient states he is he had been using since October 2022. He states that around 1 AM on 12/22, he began having chills, generalized body aches, sweats, abdominal cramps, diarrhea, nausea, vomiting, patient denies hematemesis, hematochezia. He reports beginnings of withdrawal at this time. And reports that he has been going through withdrawal.  Last use estimate was 1 to 2 days prior to presentation. Urine reveals ketones. Patient was treated with 1 L IV fluid, GI cocktail, Bentyl, Vistaril, Toradol, Ativan, Zofran. Patient continued to have symptoms of intractable emesis. Subjective (past 24 hours): 12/23/2022: Patient is actively withdrawing during interview today. He complains of being cold. He is easily lost during conversation and does not really answer questions. We will start patient on Suboxone today to ease his withdrawal symptoms. ROS: reviewed complete ROS unchanged unless otherwise stated in hospital course/subjective portion.        Medications:  Reviewed    Infusion Medications    sodium chloride      sodium chloride 100 mL/hr at 12/23/22 5724     Scheduled Medications    sodium chloride flush  5-40 mL IntraVENous 2 times per day    enoxaparin  40 mg SubCUTAneous Q24H     PRN Meds: baclofen, sodium chloride flush, sodium chloride, ondansetron **OR** ondansetron, polyethylene glycol, acetaminophen **OR** acetaminophen, promethazine, melatonin, LORazepam        Intake/Output Summary (Last 24 hours) at 12/23/2022 [address] filed at 12/23/2022 1119  Gross per 24 hour   Intake --   Output 900 ml   Net -900 ml       Exam:  BP (!) 161/85   Pulse 92   Temp 98.3 °F (36.8 °C) (Oral)   Resp 18   Ht 5' 6\" (1.676 m)   Wt 190 lb (86.2 kg)   SpO2 100%   BMI 30.67 kg/m²     General: Obese male, lying in bed, appears to be withdrawn. Appears stated age. Eyes:  EOMI. Conjunctivae/corneas clear. HENT: Head normal appearing. Nares normal. Oral mucosa moist.  Hearing intact. Neck: Supple, with full range of motion. Trachea midline. No gross JVD appreciated. Respiratory:  Normal effort. Clear to auscultation, without rales or wheezes or rhonchi. Cardiovascular: Normal rate, regular rhythm with normal S1/S2 without murmurs. No lower extremity edema. Abdomen: Soft, non-tender, non-distended with normal bowel sounds. Musculoskeletal: No joint swelling or tenderness. Normal tone. No abnormal movements. Skin: Warm and dry. No rashes or lesions. Tattoos on right arm. Neurologic:  No focal sensory/motor deficits in the upper or lower extremities. Cranial nerves:  grossly non-focal 2-12. Psychiatric: Alert and oriented, normal insight and thought content. Capillary Refill: Brisk,< 3 seconds. Peripheral Pulses: +2 palpable, equal bilaterally. Labs:   Recent Labs     12/22/22  1045 12/23/22  0537   WBC 14.0* 17.3*   HGB 16.4 15.5   HCT 45.9 44.1   * 418*     Recent Labs     12/22/22  1045 12/23/22  0537    136   K 3.7 3.5    101   CO2 22* 22*   BUN 16 14   CREATININE 0.6 0.6   CALCIUM 9.8 9.2     Recent Labs     12/22/22  1045   AST 17   ALT 20   BILITOT 1.0   ALKPHOS 98     No results for input(s): INR in the last 72 hours. No results for input(s): [place] in the last 72 hours. No results for input(s): PROCAL in the last 72 hours.    Lab Results   Component Value Date/Time    NITRU NEGATIVE 12/22/2022 11:35 AM    WBCUA 0-2 12/22/2022 11:35 AM    BACTERIA NONE SEEN 12/22/2022 11:35 AM    RBCUA 0-2 12/22/2022 11:35 AM    BLOODU NEGATIVE 12/22/2022 11:35 AM    GLUCOSEU NEGATIVE 12/22/2022 11:35 AM       Radiology (48 hours):  CT ABDOMEN PELVIS W IV CONTRAST Additional Contrast? None    Result Date: 12/22/2022   3 cm heterogeneously enhancing mass at the inferior pole of the left kidney is concerning for malignancy. No definite lymphadenopathy identified. **This report has been created using voice recognition software. It may contain minor errors which are inherent in voice recognition technology. ** Final report electronically signed by Dr. Debbie Whitman MD on 12/22/2022 2:17 PM       DVT prophylaxis:    [x] Lovenox  [] SCDs  [] SQ Heparin  [] Encourage ambulation   [] Already on Anticoagulation       Diet: ADULT DIET;  Regular  Code Status: Full Code  PT/OT: No  Tele: No  IVF: No    Electronically signed by Reinaldo Da Silva DO on 12/23/2022 at 11:32 AM    Case was discussed with Attending, Dr. Emil Mcleod

## 2022-12-23 NOTE — PROGRESS NOTES
Pt pulled out IV. Pt is having a hard time following directions for this nurse to re-insert IV. Tech in the room assist nurse. Pt is educated that IV is giving him hydration. Pt voices understanding.

## 2022-12-23 NOTE — PROGRESS NOTES
This nurse assess pt. Pt has been up and down several times in the last 30 mind. Pt states to this nurse \" I just don't feel good. \" Nurse assess patient and pt scores a 12 on the COW scale. Ativan is ordered at this time but can only be given every 6 hours and is not due until after midnight. This nurse will reach out to the doctor for additional orders. 12/22/22 9:09 PM  [phone] From: stormy nicolas [facility] 6A RE: Shima Cabral the patient is scoring a 12 on the COW scale. He is very anxious and is up and down. He reports to me that he does not \"feel\" good. He has ativan every 6 hours and I can not give another dose until after midnight. Is there anything else he can have to help him? Read 9:10 PM     12/22/22 9:11 PM   this patient was admitted by dr. Michi Goodwin the  resident. It looks like this opioid withdrawal is being managed by psychiatry but I will forward to Dr Palma Gardner the night IM resident who is also here now. 12/22/22 9:33 PM  thank you!  came up to the floor. Unread       Palma Urban up to the floor to assess patient. Dr. Palma Gardner to changed the ativan order to [id number]. Pt is updated on plan of care and is agreeable to this.

## 2022-12-23 NOTE — PROGRESS NOTES
Pharmacy Medication History Note      List of current medications patient is taking is complete. Source of information: Patient, Surescripts fill report    Changes made to medication list:  Medications removed (include reason, ex. therapy complete or physician discontinued):  None    Medications added/doses adjusted:  None    Other notes (ex. Recent course of antibiotics, Coumadin dosing):  Patient reports that he is not taking any prescription or OTC meds. Last medication shown as filled was gabapentin 300mg TID on 4/11/22 for a 90 day supply . Patient states he is no longer taking this medication. Preferred pharmacy is [facility] in [facility]  Denies use of other OTC or herbal medications.       Allergies reviewed      Electronically signed by Sanaz Guzmán [facility] on 12/23/2022 at 4:03 PM

## 2022-12-23 NOTE — CARE COORDINATION
Case Management Assessment  Initial Evaluation    Date/Time of Evaluation: 12/23/2022 8:15 AM  Assessment Completed by: Samantha Duarte RN    If patient is discharged prior to next notation, then this note serves as note for discharge by case management. Patient Name: Ramsey Sosa                   YOB: 1980  Diagnosis: Opioid withdrawal ([facility] [address] [F11.93]  Polysubstance abuse ([facility] [address] [F19.10]  Intractable nausea and vomiting [R11.2]                   Date / Time: 12/22/2022 10:29 AM  Location: [facility]18/018-A     Patient Admission Status: Observation   Readmission Risk (Low < 19, Mod (19-27), High > 27): No data recorded  Current PCP: No primary care provider on file. PCP verified by CM? Chart Reviewed: Yes      History Provided by:    Patient Orientation:      Patient Cognition:      Hospitalization in the last 30 days (Readmission):  No    If yes, Readmission Assessment in CM Navigator will be completed. Advance Directives:      Code Status: Full Code   Patient's Primary Decision Maker is:        Discharge Planning:    Patient lives with:   Type of Home:    Primary Care Giver:    Patient Support Systems include:     Current Financial resources:    Current community resources:    Current services prior to admission:              Current DME:              Type of Home Care services:       ADLS  Prior functional level:    Current functional level:      Family can provide assistance at DC: Would you like Case Management to discuss the discharge plan with any other family members/significant others, and if so, who?     Plans to Return to Present Housing:    Other Identified Issues/Barriers to RETURNING to current housing: none  Potential Assistance needed at discharge:              Potential DME:    Patient expects to discharge to:    Plan for transportation at discharge:      Financial    Payor: [address] [place] [address]  [address] / Plan: [address] [place] [address] [address] / Product Type: *No Product type* / Does insurance require precert for SNF: Yes    Potential assistance Purchasing Medications:    Meds-to-Beds request: Yes    No Pharmacies Listed    Notes:    Factors facilitating achievement of predicted outcomes: Family support    Barriers to discharge: Depression    Additional Case Management Notes: IVF, ativan, phenergan, and zofran prn. Bedside sitter. Psych consult. The Plan for Transition of Care is related to the following treatment goals of Opioid withdrawal ([facility] [address] [F11.93]  Polysubstance abuse ([facility] [address] [F19.10]  Intractable nausea and vomiting [R11.2]    Patient Goals/Plan/Treatment Preferences: Spoke with Jimmy Peoples wife. She plans for Leena Close to return home at discharge. She states he is physically independent, but needs help with addictions and counseling. Discussed that Dr. Abundio Diamond and Addiction services has been consulted. Denies further needs. Transportation/Food Security/Housekeeping Addressed: No issues identified.      Dianelys Valdes RN  Case Management Department

## 2022-12-24 VITALS
BODY MASS INDEX: 30.53 KG/M2 | OXYGEN SATURATION: 98 % | SYSTOLIC BLOOD PRESSURE: 130 MMHG | DIASTOLIC BLOOD PRESSURE: 92 MMHG | TEMPERATURE: 97.4 F | HEIGHT: 66 IN | WEIGHT: 190 LBS | HEART RATE: 94 BPM | RESPIRATION RATE: 18 BRPM

## 2022-12-24 LAB
ANION GAP SERPL CALCULATED.3IONS-SCNC: 11 MEQ/L (ref 8–16)
BUN BLDV-MCNC: 10 MG/DL (ref 7–22)
CALCIUM SERPL-MCNC: 9.2 MG/DL (ref 8.5–10.5)
CHLORIDE BLD-SCNC: 104 MEQ/L (ref 98–111)
CO2: 23 MEQ/L (ref 23–33)
CREAT SERPL-MCNC: 0.6 MG/DL (ref 0.4–1.2)
ERYTHROCYTE [DISTWIDTH] IN BLOOD BY AUTOMATED COUNT: 12.1 % (ref 11.5–14.5)
ERYTHROCYTE [DISTWIDTH] IN BLOOD BY AUTOMATED COUNT: 37 FL (ref 35–45)
GFR SERPL CREATININE-BSD FRML MDRD: > 60 ML/MIN/1.73M2
GLUCOSE BLD-MCNC: 149 MG/DL (ref 70–108)
HCT VFR BLD CALC: 45.5 % (ref 42–52)
HEMOGLOBIN: 16.2 GM/DL (ref 14–18)
MCH RBC QN AUTO: 30.5 PG (ref 26–33)
MCHC RBC AUTO-ENTMCNC: 35.6 GM/DL (ref 32.2–35.5)
MCV RBC AUTO: 85.5 FL (ref 80–94)
PLATELET # BLD: 394 THOU/MM3 (ref 130–400)
PMV BLD AUTO: 9.8 FL (ref 9.4–12.4)
POTASSIUM SERPL-SCNC: 3.6 MEQ/L (ref 3.5–5.2)
RBC # BLD: 5.32 MILL/MM3 (ref 4.7–6.1)
SODIUM BLD-SCNC: 138 MEQ/L (ref 135–145)
WBC # BLD: 16.5 THOU/MM3 (ref 4.8–10.8)

## 2022-12-24 PROCEDURE — 6370000000 HC RX 637 (ALT 250 FOR IP)

## 2022-12-24 PROCEDURE — 6370000000 HC RX 637 (ALT 250 FOR IP): Performed by: STUDENT IN AN ORGANIZED HEALTH CARE EDUCATION/TRAINING PROGRAM

## 2022-12-24 PROCEDURE — 36415 COLL VENOUS BLD VENIPUNCTURE: CPT

## 2022-12-24 PROCEDURE — 96376 TX/PRO/DX INJ SAME DRUG ADON: CPT

## 2022-12-24 PROCEDURE — 85027 COMPLETE CBC AUTOMATED: CPT

## 2022-12-24 PROCEDURE — 6370000000 HC RX 637 (ALT 250 FOR IP): Performed by: INTERNAL MEDICINE

## 2022-12-24 PROCEDURE — 80048 BASIC METABOLIC PNL TOTAL CA: CPT

## 2022-12-24 PROCEDURE — G0378 HOSPITAL OBSERVATION PER HR: HCPCS

## 2022-12-24 PROCEDURE — 6360000002 HC RX W HCPCS

## 2022-12-24 PROCEDURE — 2580000003 HC RX 258: Performed by: STUDENT IN AN ORGANIZED HEALTH CARE EDUCATION/TRAINING PROGRAM

## 2022-12-24 RX ORDER — TRAZODONE HYDROCHLORIDE 50 MG/1
50 TABLET ORAL NIGHTLY
Qty: 7 TABLET | Refills: 0 | Status: SHIPPED | OUTPATIENT
Start: 2022-12-24 | End: 2022-12-31

## 2022-12-24 RX ORDER — LANOLIN ALCOHOL/MO/W.PET/CERES
1.5 CREAM (GRAM) TOPICAL NIGHTLY PRN
Qty: 10 TABLET | Refills: 0 | Status: SHIPPED | OUTPATIENT
Start: 2022-12-24 | End: 2023-01-03

## 2022-12-24 RX ORDER — LISINOPRIL 20 MG/1
20 TABLET ORAL DAILY
Qty: 30 TABLET | Refills: 3 | Status: SHIPPED | OUTPATIENT
Start: 2022-12-25

## 2022-12-24 RX ORDER — DOXEPIN HYDROCHLORIDE 10 MG/1
10 CAPSULE ORAL NIGHTLY PRN
Status: DISCONTINUED | OUTPATIENT
Start: 2022-12-24 | End: 2022-12-24 | Stop reason: HOSPADM

## 2022-12-24 RX ORDER — BUPRENORPHINE AND NALOXONE 8; 2 MG/1; MG/1
1 FILM, SOLUBLE BUCCAL; SUBLINGUAL 2 TIMES DAILY
Qty: 8 EACH | Refills: 0 | Status: SHIPPED | OUTPATIENT
Start: 2022-12-24 | End: 2022-12-28

## 2022-12-24 RX ORDER — CYCLOBENZAPRINE HCL 10 MG
10 TABLET ORAL 3 TIMES DAILY PRN
Qty: 30 TABLET | Refills: 0 | Status: SHIPPED | OUTPATIENT
Start: 2022-12-24 | End: 2023-01-03

## 2022-12-24 RX ADMIN — BUPRENORPHINE AND NALOXONE 1 FILM: 8; 2 FILM BUCCAL; SUBLINGUAL at 08:19

## 2022-12-24 RX ADMIN — Medication 1.5 MG: at 00:45

## 2022-12-24 RX ADMIN — Medication: at 11:48

## 2022-12-24 RX ADMIN — SODIUM CHLORIDE, PRESERVATIVE FREE 10 ML: 5 INJECTION INTRAVENOUS at 08:19

## 2022-12-24 RX ADMIN — LORAZEPAM 1 MG: 2 INJECTION INTRAMUSCULAR; INTRAVENOUS at 08:20

## 2022-12-24 RX ADMIN — LORAZEPAM 1 MG: 2 INJECTION INTRAMUSCULAR; INTRAVENOUS at 03:38

## 2022-12-24 RX ADMIN — LABETALOL HYDROCHLORIDE 50 MG: 100 TABLET, FILM COATED ORAL at 08:19

## 2022-12-24 RX ADMIN — DOXEPIN HYDROCHLORIDE 10 MG: 10 CAPSULE ORAL at 03:04

## 2022-12-24 RX ADMIN — BACLOFEN 10 MG: 10 TABLET ORAL at 00:45

## 2022-12-24 RX ADMIN — LISINOPRIL 20 MG: 20 TABLET ORAL at 08:19

## 2022-12-24 ASSESSMENT — PAIN SCALES - GENERAL
PAINLEVEL_OUTOF10: 3
PAINLEVEL_OUTOF10: 7

## 2022-12-24 ASSESSMENT — PAIN DESCRIPTION - PAIN TYPE: TYPE: CHRONIC PAIN

## 2022-12-24 ASSESSMENT — PAIN - FUNCTIONAL ASSESSMENT: PAIN_FUNCTIONAL_ASSESSMENT: ACTIVITIES ARE NOT PREVENTED

## 2022-12-24 ASSESSMENT — PAIN DESCRIPTION - LOCATION
LOCATION: BACK
LOCATION: ABDOMEN

## 2022-12-24 ASSESSMENT — PAIN DESCRIPTION - FREQUENCY: FREQUENCY: CONTINUOUS

## 2022-12-24 ASSESSMENT — PAIN DESCRIPTION - ORIENTATION
ORIENTATION: LOWER
ORIENTATION: UPPER

## 2022-12-24 ASSESSMENT — PAIN DESCRIPTION - DESCRIPTORS: DESCRIPTORS: ACHING

## 2022-12-24 ASSESSMENT — PAIN DESCRIPTION - ONSET: ONSET: ON-GOING

## 2022-12-24 NOTE — DISCHARGE SUMMARY
Internal Medicine Resident Discharge Summary      Patient Identification:   Nikhil Turcios   : 1980  MRN: [id number]   Account: [de-identified]   Patient's PCP: NARA García-MONTY    Admit Date: 2022   Discharge Date: 2022      Admitting Physician: Leigh Edwards MD  Discharge Physician: Leigh Edwards MD       Discharge Diagnoses:  Intractable emesis -secondary to opioid withdrawal.  Multiple medications tried without much success in the ED. Urine noted to have ketones. Patient received 1 L of IV fluid. - RESOLVED  Plan -initiate Phenergan every 4 hours for nausea. Continue maintenance IV fluids at 100 cc/h  FLUIDS STOPPED  SYMPTOMS RESOLVED     Opioid withdrawal -history of opioid use, recent relapse 10/2022 now with symptoms of opioid withdrawal including abdominal cramping, diarrhea, nausea, emesis. -Urine positive for amphetamines, cannabinoids, oxycodone, fentanyl. Patient interested to get help for his underlying opioid addiction. He will likely need to be seen by Suboxone clinic as an outpatient. Plan -Dr. Isabel Ferguson consulted, appreciate recommendations.  -Ativan as needed for anxiety. Addiction services notified.  -Recommends Suboxone 8 mg BID started today  -Follow with outpatient MAT  -Discharged with 4 days worth of Suboxone PER PSYCH RECOMMENDATIONS  -Consider Bentyl 20 qid, Flexeril, trazodone to help with withdrawal symptoms. Plan  Continue Suboxone for 4 days after discharge. Continue trazodone for 7 days after discharge  Continue Flexeril 1 3 times a day as needed. Continue melatonin 3 mg nightly as needed. Schedule appointments with PCP within 1 week of discharge  Schedule appointment by 2022 with, [facility] preferably 2022. 3 cm heterogeneously enhancing mass at the inferior pole of the left kidney -as seen on CT abdomen / pelvis. Concern for malignancy.   Patient will need to follow-up as an outpatient with urology- pcp follow up     Essential HTN:   SBP >160  Pt was taking Enalapril. Was discontinued   Plan:  -Will start on Labetalol 50mg bid.  -continue to monitor       Hospital Course:       From Hx: Patient is a 38 y.o. male w/ PMHx Opiod Use disorder, Bipolar Disorder, Major Depressive Disorder, Essential HTN, and a period of incarceration who presented to [facility] for evaluation of nausea/vomiting. Patient was unwilling or poorly able to provide history. It was provided per patient's wife, he has been clean for the last 5 years. Recently, following the death of his stepfather, he began to use again. His drug screen was positive for oxycodone, fentanyl, amphetamines, cannabis. Patient states he is he had been using since October 2022. He states that around 1 AM on 12/22, he began having chills, generalized body aches, sweats, abdominal cramps, diarrhea, nausea, vomiting, patient denies hematemesis, hematochezia. He reports beginnings of withdrawal at this time. And reports that he has been going through withdrawal.  Last use estimate was 1 to 2 days prior to presentation. Urine reveals ketones. Patient was treated with 1 L IV fluid, GI cocktail, Bentyl, Vistaril, Toradol, Ativan, Zofran. Patient continued to have symptoms of intractable emesis. The patient was seen and examined on day of discharge and this discharge summary is in conjunction with any daily progress note from day of discharge. The patient is discharged in stable condition. Yes      Exam:   Vitals:  Vitals:    12/24/22 0303 12/24/22 0800 12/24/22 0849 12/24/22 1130   BP: (!) 161/95 (!) 149/85  (!) 130/92   Pulse: 71 75  94   Resp: 18 18 16 18   Temp: 98.2 °F (36.8 °C) 98.3 °F (36.8 °C)  97.4 °F (36.3 °C)   TempSrc: Oral Oral  Oral   SpO2: 98% 98%  98%   Weight:       Height:         Weight: Weight: 190 lb (86.2 kg)     General: Obese male, lying in bed, with generalized body tattoos. Harden Beelogan  Appears stated age. Eyes:  EOMI. Conjunctivae/corneas clear. HENT: Head normal appearing. Nares normal. Oral mucosa moist.  Hearing intact. Neck: Supple, with full range of motion. Trachea midline. No gross JVD appreciated. Respiratory:  Normal effort. Clear to auscultation, without rales or wheezes or rhonchi. Cardiovascular: Normal rate, regular rhythm with normal S1/S2 without murmurs. No lower extremity edema. Abdomen: Soft, obese, non-tender, non-distended with normal bowel sounds. Musculoskeletal: No joint swelling or tenderness. Normal tone. No abnormal movements. Skin: Warm and dry. No rashes or lesions. Tattoos on right arm. Neurologic:  No focal sensory/motor deficits in the upper or lower extremities. Psychiatric: Alert and oriented, normal insight and thought content. Peripheral Pulses: +2 palpable, equal bilaterally. Labs: For convenience the most recent labs are provided:  CBC:    Lab Results   Component Value Date/Time    WBC 16.5 12/24/2022 08:36 AM    HGB 16.2 12/24/2022 08:36 AM    HCT 45.5 12/24/2022 08:36 AM     12/24/2022 08:36 AM     Renal:    Lab Results   Component Value Date/Time     12/24/2022 08:36 AM    K 3.6 12/24/2022 08:36 AM     12/24/2022 08:36 AM    CO2 23 12/24/2022 08:36 AM    BUN 10 12/24/2022 08:36 AM    CREATININE 0.6 12/24/2022 08:36 AM    CALCIUM 9.2 12/24/2022 08:36 AM     Liver:   Lab Results   Component Value Date/Time    AST 17 12/22/2022 10:45 AM    ALT 20 12/22/2022 10:45 AM         Significant Diagnostic Studies    Radiology:   CT ABDOMEN PELVIS W IV CONTRAST Additional Contrast? None   Final Result    3 cm heterogeneously enhancing mass at the inferior pole of the left kidney is concerning for malignancy. No definite lymphadenopathy identified. **This report has been created using voice recognition software. It may contain minor errors which are inherent in voice recognition technology. **      Final report electronically signed by Dr. Marshall Geller MD on 12/22/2022 2:17 PM             Consults:   IP CONSULT TO CASE MANAGEMENT  IP CONSULT TO PSYCHIATRY  IP CONSULT TO ADDICTION SERVICES    Disposition: Home  Condition at Discharge: Stable    Code Status:  Prior Yes    Patient Instructions:    Discharge lab work: None  Activity: activity as tolerated  Diet: No diet orders on file      Follow-up visits:   Trae Hernandez  [address]  [phone]    Schedule an appointment as soon as possible for a visit in 1 week(s)      [place] [address]  Schedule an appointment as soon as possible for a visit in 2 day(s)  Make appointment as soon as possible. Preferably by Tuesday, Decmeber 27, 2022. Discharge Medications:      Medication List        START taking these medications      buprenorphine-naloxone 8-2 MG Film SL film  Commonly known as: SUBOXONE  Place 1 Film under the tongue in the morning and at bedtime for 4 days.  Max Daily Amount: 2 Film     cyclobenzaprine 10 MG tablet  Commonly known as: FLEXERIL  Take 1 tablet by mouth 3 times daily as needed for Muscle spasms     lisinopril 20 MG tablet  Commonly known as: PRINIVIL;ZESTRIL  Take 1 tablet by mouth daily     melatonin 3 MG Tabs tablet  Take 0.5 tablets by mouth nightly as needed (take as needed for sleep)     traZODone 50 MG tablet  Commonly known as: DESYREL  Take 1 tablet by mouth nightly for 7 days               Where to Get Your Medications        These medications were sent to [facility]/pharmacy #[id number]- [place] [address] - F [phone]  [address] [place] [address]      Phone: [phone]   buprenorphine-naloxone 8-2 MG Film SL film  cyclobenzaprine 10 MG tablet  lisinopril 20 MG tablet  melatonin 3 MG Tabs tablet  traZODone 50 MG tablet                    Time Spent on discharge is 40  minutes in the examination, evaluation, counseling and review of medications and discharge plan. Thank you NARA Muro-MONTY for the opportunity to be involved in this patient's care.       Signed:    Electronically signed by Baldomero Bernard DO on 12/24/22 at 2:44 PM EST     Case was discussed with Attending, Dr. Celso Alford

## 2022-12-24 NOTE — DISCHARGE SUMMARY
Patient discharged home with wife. IV removed. AVS reviewed with patient. Prescriptions sent to [facility] pharmacy in [facility]. Patient educated on importance of making follow up appointments. Follow up appointments unable to be scheduled due to the holiday, Anh Rossy. Messages left at PCP's office and Octavio's in [facility] that patient would be calling to schedule follow up appointment. Patient denied any further questions. AVS signed and patient discharged home with wife.

## 2022-12-24 NOTE — FLOWSHEET NOTE
12/24/22 0241   Treatment Team Notification   Reason for Communication Evaluate   Team Member Name Dr. Vinay Hurst Team Role Resident   Method of Communication Secure Message   Response See orders   Notification Time [phone]     Updated Dr. Mira Novoa that patient's CIWA score was 12 and ativan, zofran, baclofen, and melatonin had been given. Pt was complaining about not being able to sleep. Dr. Mira Novoa placed order for doxepin prn nightly. Refer to [facility]

## 2022-12-24 NOTE — PLAN OF CARE
Problem: Discharge Planning  Goal: Discharge to home or other facility with appropriate resources  12/24/2022 1352 by Jesus Knight RN  Outcome: Completed  Flowsheets (Taken 12/24/2022 1352)  Discharge to home or other facility with appropriate resources:   Identify barriers to discharge with patient and caregiver   Arrange for needed discharge resources and transportation as appropriate  Note: Patient discharged home with wife and kids. Problem: Pain  Goal: Verbalizes/displays adequate comfort level or baseline comfort level  12/24/2022 1352 by Jesus Knight RN  Outcome: Completed  Flowsheets (Taken 12/24/2022 1352)  Verbalizes/displays adequate comfort level or baseline comfort level:   Encourage patient to monitor pain and request assistance   Assess pain using appropriate pain scale   Administer analgesics based on type and severity of pain and evaluate response  Note: Patient discharged with flexeril for back pain. Problem: Risk for Elopement  Goal: Patient will not exit the unit/facility without proper excort  12/24/2022 1352 by Jesus Knight RN  Outcome: Completed  Flowsheets (Taken 12/24/2022 1352)  Nursing Interventions for Elopement Risk:   Collaborate with family members/caregivers to mitigate the elopement risk   Collaborate with treatment team for drug withdrawal symptoms treatment   Communicate/escalate to charge nurse the risk of elopement  Note: Patient had discharge order at time of exit. Problem: Safety - Adult  Goal: Free from fall injury  12/24/2022 1352 by Jesus Knight RN  Outcome: Completed  Flowsheets (Taken 12/24/2022 1352)  Free From Fall Injury: Instruct family/caregiver on patient safety  Note: Patient remained free from safety or new injury during stay.       Problem: ABCDS Injury Assessment  Goal: Absence of physical injury  12/24/2022 1352 by Jesus Knight RN  Outcome: Completed  Flowsheets (Taken 12/24/2022 1352)  Absence of Physical Injury: Implement safety measures based on patient assessment  Note: Patient remained free from injury during stay. Care plan reviewed with patient. Patient verbalizes understanding of the plan of care and contributes to goal setting.

## 2022-12-24 NOTE — PROGRESS NOTES
Pt is agitated at this time stating \"no one has done anything to help me. Tonight is worse. I can't get any sleep with you guys always coming in here. I will leave and find a doctor or a place that will do something for me cause this place ain't it. If you come in here one more time and wake me up, I'm going to get dressed and leave. I will be doped up by noon. \" This RN explained that she has given the patient everything she can at this time. The patient has also been on his call light multiple times requesting things like drinks and saying he does not feel good. When this RN has entered the room, the patient's eyes are closed and this RN has to say his name to wake him up. Pt is increasing in agitation. Bed alarm on, telesitter at bedside.

## 2022-12-24 NOTE — PLAN OF CARE
Problem: Discharge Planning  Goal: Discharge to home or other facility with appropriate resources  Outcome: Progressing  Note: Pt plans to return home upon discharge with wife and family. Problem: Pain  Goal: Verbalizes/displays adequate comfort level or baseline comfort level  Outcome: Progressing  Note: Pt is resting quietly with eyes closed at this time, will continue to assess using 0-10 pain scale. Problem: Risk for Elopement  Goal: Patient will not exit the unit/facility without proper excort  Outcome: Progressing  Flowsheets (Taken 12/23/2022 1700 by Rafia Forte RN)  Nursing Interventions for Elopement Risk:   Reduce environmental triggers   Make sure patient has all necessary personal care items  Note: Pt has not expressed concerns for leaving without advisement to do so. Will continue to assess. Problem: Safety - Adult  Goal: Free from fall injury  Outcome: Progressing  Note: No fall so far this shift, call light and bedside table within reach. Bed in lowest position and alarm on, nonskid socks on bilaterally. Telesitter at bedside. Problem: ABCDS Injury Assessment  Goal: Absence of physical injury  Outcome: Progressing  Note: No harm to self or others at this time, will continue to encourage a calm environment. Care plan reviewed with patient. Patient verbalize understanding of the plan of care and contribute to goal setting.

## 2022-12-27 RX ORDER — OMEPRAZOLE MAGNESIUM 20 MG
CAPSULE,DELAYED RELEASE (ENTERIC COATED) ORAL
Qty: 10 TABLET | Refills: 0 | OUTPATIENT
Start: 2022-12-27